# Patient Record
Sex: FEMALE | Race: WHITE | NOT HISPANIC OR LATINO | Employment: FULL TIME | URBAN - METROPOLITAN AREA
[De-identification: names, ages, dates, MRNs, and addresses within clinical notes are randomized per-mention and may not be internally consistent; named-entity substitution may affect disease eponyms.]

---

## 2023-05-25 ENCOUNTER — TELEPHONE (OUTPATIENT)
Dept: PRIMARY CARE | Facility: CLINIC | Age: 53
End: 2023-05-25
Payer: COMMERCIAL

## 2023-05-25 DIAGNOSIS — Z78.0 POSTMENOPAUSAL: ICD-10-CM

## 2023-05-25 DIAGNOSIS — L71.9 ACNE ROSACEA: ICD-10-CM

## 2023-05-25 DIAGNOSIS — E55.9 VITAMIN D DEFICIENCY: ICD-10-CM

## 2023-05-25 DIAGNOSIS — E03.9 HYPOTHYROIDISM, UNSPECIFIED TYPE: ICD-10-CM

## 2023-05-25 NOTE — TELEPHONE ENCOUNTER
Patient requesting the labs orders for bone density, vitamin D, lipid panel, complete blood count, CBC, and TSH. Patient stating that the orders she has are  from 6 months ago.    Thank you.

## 2023-05-31 PROBLEM — L71.9 ACNE ROSACEA: Status: ACTIVE | Noted: 2023-05-31

## 2023-05-31 PROBLEM — E55.9 VITAMIN D DEFICIENCY: Status: ACTIVE | Noted: 2023-05-31

## 2023-05-31 PROBLEM — E03.9 HYPOTHYROIDISM: Status: ACTIVE | Noted: 2023-05-31

## 2023-05-31 PROBLEM — C44.310 BASAL CELL CARCINOMA, FACE: Status: ACTIVE | Noted: 2023-05-31

## 2023-05-31 PROBLEM — R14.0 BLOATING: Status: ACTIVE | Noted: 2023-05-31

## 2023-10-04 DIAGNOSIS — E55.9 VITAMIN D DEFICIENCY: Primary | ICD-10-CM

## 2023-10-04 RX ORDER — ACETAMINOPHEN 500 MG
50 TABLET ORAL DAILY
Qty: 90 CAPSULE | Refills: 1 | Status: SHIPPED | OUTPATIENT
Start: 2023-10-04 | End: 2024-04-01

## 2024-01-30 RX ORDER — CLINDAMYCIN PHOSPHATE 10 MG/G
GEL TOPICAL
Qty: 60 G | Refills: 0 | OUTPATIENT
Start: 2024-01-30

## 2024-02-02 ENCOUNTER — TELEPHONE (OUTPATIENT)
Dept: PRIMARY CARE | Facility: CLINIC | Age: 54
End: 2024-02-02
Payer: COMMERCIAL

## 2024-02-02 DIAGNOSIS — L71.9 ACNE ROSACEA: ICD-10-CM

## 2024-02-02 RX ORDER — CLINDAMYCIN PHOSPHATE 10 MG/G
GEL TOPICAL
COMMUNITY
End: 2024-02-02 | Stop reason: SDUPTHER

## 2024-02-02 RX ORDER — CLINDAMYCIN PHOSPHATE 10 MG/G
GEL TOPICAL
Qty: 30 G | Refills: 0 | Status: SHIPPED | OUTPATIENT
Start: 2024-02-02 | End: 2024-05-25 | Stop reason: SDUPTHER

## 2024-02-02 NOTE — TELEPHONE ENCOUNTER
Refill Clindamycin 1% gel apply twice daily    Pharmacy: [facility]     LR: 10/01/22  LV: 10/11/22  NV: 02/20/24

## 2024-02-13 ENCOUNTER — APPOINTMENT (OUTPATIENT)
Dept: PRIMARY CARE | Facility: CLINIC | Age: 54
End: 2024-02-13
Payer: COMMERCIAL

## 2024-02-20 ENCOUNTER — OFFICE VISIT (OUTPATIENT)
Dept: PRIMARY CARE | Facility: CLINIC | Age: 54
End: 2024-02-20
Payer: COMMERCIAL

## 2024-02-20 VITALS
WEIGHT: 141 LBS | BODY MASS INDEX: 27.54 KG/M2 | TEMPERATURE: 98.3 F | DIASTOLIC BLOOD PRESSURE: 83 MMHG | SYSTOLIC BLOOD PRESSURE: 117 MMHG

## 2024-02-20 DIAGNOSIS — Z00.00 HEALTH CARE MAINTENANCE: Primary | ICD-10-CM

## 2024-02-20 DIAGNOSIS — E03.9 HYPOTHYROIDISM, UNSPECIFIED TYPE: ICD-10-CM

## 2024-02-20 DIAGNOSIS — E78.1 HYPERTRIGLYCERIDEMIA: ICD-10-CM

## 2024-02-20 DIAGNOSIS — E55.9 VITAMIN D DEFICIENCY: ICD-10-CM

## 2024-02-20 PROCEDURE — 99214 OFFICE O/P EST MOD 30 MIN: CPT | Performed by: FAMILY MEDICINE

## 2024-02-20 PROCEDURE — 1036F TOBACCO NON-USER: CPT | Performed by: FAMILY MEDICINE

## 2024-02-20 RX ORDER — LEVOTHYROXINE SODIUM 75 UG/1
75 TABLET ORAL DAILY
COMMUNITY

## 2024-02-20 NOTE — PROGRESS NOTES
Subjective   Patient ID: [id number]     Hali Willis is a 54 y.o. female who presents for Med Management.    HPI  Taking meds as directed without tolerability or affordability issues; no complaints today.  Patient denies ever having melanoma    Review of Systems  GEN-denies, fever, weakness or myalgias; no unexplained fever or chills  OPTH-No dry eyes, itchy eyes, or blurry vision   ENT-No hearing loss, tinnitus or vertigo  NECK-no stiffness, swelling or pain  PSYCH-No complaints regarding libido, appetite, memory or concentration;  no drug use or alcohol usage over six per week  SLEEP-No complaints of insomnia, apnea or restless legs  ALL/IMMUN-No history of sneezing or itching  HEM-No unexplained bruising or bleeding    Objective     /83 (BP Location: Left arm, Patient Position: Sitting)   Temp 36.8 °C (98.3 °F) (Oral)   Wt 64 kg (141 lb)   BMI 27.54 kg/m²      Physical Exam  Eyes-pupils equal round, reactive to light and accommodation, fundi with normal cup/disc ratio, conjunctiva without redness or discharge  General- well defined, well nourished, well hydrated individual in NAD  Skin- normal color and turgor; without nail pitting  Head-normocephalic without masses or tenderness  Ears-normal pinnae, and canals, with normal landmarks and light reflex of tympanic membranes bilaterally  Nose-septum in the midline, normal mucosa bilaterally  Throat- without erythema or exudate, uvula in midline  Neck-supple without lymphadenopathy or thyromegaly; no carotid bruits  Heart- regular rate and rhythm, normal s1 and s2 without murmur or gallop  Lungs-clear to auscultation  Abdomen-soft, positive bowel sounds, without masses, HSmegaly or pain     Assessment/Plan     Problem List Items Addressed This Visit       Hypothyroidism    Vitamin D deficiency    Relevant Orders    Vitamin D 25-Hydroxy,Total (for eval of Vitamin D levels)     Other Visit Diagnoses       Health care maintenance    -  Primary    Relevant Orders     BI mammo bilateral screening tomosynthesis    Hypertriglyceridemia        Relevant Orders    Lipid panel    Comprehensive metabolic panel          You are eligible for the COVID booster.  Without a recent (within 90 days) challenge (booster or infection) your immune system will be three days behind in protecting you from the infection.  You will infect approximately five people by that time.    Please get your mammogram and bone density.    Follow up fasting (no alcohol for 48 hours and just water for 14 hours) in one year for your next routine appointment.  In general, take any medications on schedule (except for types of Insulin).        Zaheer Hernández MD

## 2024-02-20 NOTE — PATIENT INSTRUCTIONS
You are eligible for the COVID booster.  Without a recent (within 90 days) challenge (booster or infection) your immune system will be three days behind in protecting you from the infection.  You will infect approximately five people by that time.    Please get your mammogram and bone density.    Follow up fasting (no alcohol for 48 hours and just water for 14 hours) in one year for your next routine appointment.  In general, take any medications on schedule (except for types of Insulin).

## 2024-04-02 DIAGNOSIS — L71.9 ACNE ROSACEA: ICD-10-CM

## 2024-04-02 RX ORDER — CLINDAMYCIN PHOSPHATE 10 MG/G
GEL TOPICAL
Qty: 30 G | Refills: 0 | OUTPATIENT
Start: 2024-04-02

## 2024-05-23 DIAGNOSIS — L71.9 ACNE ROSACEA: ICD-10-CM

## 2024-05-24 RX ORDER — CLINDAMYCIN PHOSPHATE 10 MG/G
GEL TOPICAL
Qty: 30 G | Refills: 0 | OUTPATIENT
Start: 2024-05-24

## 2024-05-25 ENCOUNTER — TELEPHONE (OUTPATIENT)
Dept: PRIMARY CARE | Facility: CLINIC | Age: 54
End: 2024-05-25
Payer: COMMERCIAL

## 2024-05-25 DIAGNOSIS — L71.9 ACNE ROSACEA: ICD-10-CM

## 2024-05-25 RX ORDER — CLINDAMYCIN PHOSPHATE 10 MG/G
GEL TOPICAL
Qty: 60 G | Refills: 1 | Status: SHIPPED | OUTPATIENT
Start: 2024-05-25

## 2024-05-25 NOTE — TELEPHONE ENCOUNTER
Dr. CANALES pt and DR. BETTS is covering     Refill:    Clindamycin 1 %gel Apply and gently massage into affected areas twice daily     Pharm: [facility] # [phone]    LR : 02/02/24 qty 1 no refills  LV: 02/20/24  NV: 02/25/25

## 2024-11-25 ENCOUNTER — TELEPHONE (OUTPATIENT)
Dept: PRIMARY CARE | Facility: CLINIC | Age: 54
End: 2024-11-25
Payer: COMMERCIAL

## 2024-11-25 DIAGNOSIS — L71.9 ACNE ROSACEA: ICD-10-CM

## 2024-11-25 RX ORDER — CLINDAMYCIN PHOSPHATE 10 MG/G
GEL TOPICAL
Qty: 60 G | Refills: 1 | Status: SHIPPED | OUTPATIENT
Start: 2024-11-25

## 2024-11-25 NOTE — TELEPHONE ENCOUNTER
Rx Refill Request Telephone Encounter    Name:  Hali Willis  :  [id number]  Medication Name:  clindamycin (Cleocin T) 1 % gel   Dispense Quantity: 60 g  Last refill: 24  Sig: Apply and gently massage into affected areas twice daily  Specific Pharmacy location:  [id number] [address] [facility]  [address] [facility] [id number]  Date of last appointment:  24  Date of next appointment:  25  Best number to reach patient:  [phone]

## 2025-02-25 ENCOUNTER — APPOINTMENT (OUTPATIENT)
Dept: PRIMARY CARE | Facility: CLINIC | Age: 55
End: 2025-02-25
Payer: COMMERCIAL

## 2025-03-04 ENCOUNTER — APPOINTMENT (OUTPATIENT)
Dept: PRIMARY CARE | Facility: CLINIC | Age: 55
End: 2025-03-04
Payer: COMMERCIAL

## 2025-03-16 DIAGNOSIS — L71.9 ACNE ROSACEA: ICD-10-CM

## 2025-03-17 RX ORDER — CLINDAMYCIN PHOSPHATE 10 MG/G
GEL TOPICAL
Qty: 60 G | Refills: 0 | OUTPATIENT
Start: 2025-03-17

## 2025-03-19 DIAGNOSIS — L71.9 ACNE ROSACEA: ICD-10-CM

## 2025-03-20 RX ORDER — CLINDAMYCIN PHOSPHATE 10 MG/G
GEL TOPICAL
Qty: 60 G | Refills: 1 | Status: SHIPPED | OUTPATIENT
Start: 2025-03-20

## 2025-03-20 NOTE — TELEPHONE ENCOUNTER
JT PT DG is covering. Pt needs a 30 day supply sent by a covering. Pt called stating that she is almost out of medication.

## 2025-04-15 ENCOUNTER — APPOINTMENT (OUTPATIENT)
Dept: PRIMARY CARE | Facility: CLINIC | Age: 55
End: 2025-04-15
Payer: COMMERCIAL

## 2025-04-23 NOTE — PROGRESS NOTES
Subjective   Patient ID: [id number]     Hali Willis is a 55 y.o. female who presents for Annual Exam.    HPI  Hali presents for a complete physical.  Still an occasional smoker  Patient defers on pneumonia vaccine    Review of Systems  General-denies weakness or myalgias;  no unexplained fever or chills  Cardiovascular- No chest pain or pressure, nausea, diaphoresis, paresthesias, dizziness, or syncope with or without exertion  Gastrointestinal-No blood in stool, tarry stools, pain, vomiting, heartburn, constipation or diarrhea  Pulmonary-No wheezing, coughing or shortness of breath  Psychiatric-No complaints regarding libido, appetite, memory or concentration;  no drug use or alcohol usage over six per week  Endocrinology- No change in hair, voice, skin, weight or temperature tolerance   Sleep-No complaints of insomnia, apnea or restless legs  Urology-No frequency, urgency, blood in urine, or incontinence  Musculoskeletal-No locking, giving way/swelling of joints  Neurology- No headaches, hx of concussion, falls in the last year or seizure  Allergy/Immunology-No history of sneezing or itching  Dermatology-No rashes, blanching or  change in any moles   ENT-No hearing loss, tinnitus or vertigo  OPTH-no blurry vision, dry or itchy eyes    Objective     /76 (BP Location: Left arm, Patient Position: Sitting)   Temp 36.7 °C (98.1 °F) (Oral)   Ht 1.524 m (5')   Wt 65.3 kg (143 lb 15.4 oz)   BMI 28.12 kg/m²      Physical Exam  Eyes-pupils equal round, reactive to light and accommodation, fundi with normal cup/disc ratio, conjunctiva without redness or discharge  General- well defined, well nourished, well hydrated individual in NAD  Skin- normal color and turgor; without nail pitting  Head-normocephalic without masses or tenderness  Ears-normal pinnae, and canals, with normal landmarks and light reflex of tympanic membranes bilaterally  Nose-septum in the midline, normal mucosa bilaterally  Throat- without  erythema or exudate, uvula in midline  Neck-supple without lymphadenopathy or thyromegaly; no carotid bruits  Heart- regular rate and rhythm, normal s1 and s2 without murmur or gallop  Lungs-clear to auscultation  Abdomen-soft, positive bowel sounds, without masses, HSmegaly or pain   Extremities- without cords, cyanosis, clubbing or edema;  no nodes/joint swelling  Back-without CVA or spine tenderness    ASCVD risk is 4.6% over the next ten years after review of [facility] labs    Assessment/Plan     Problem List Items Addressed This Visit       Acne rosacea    Relevant Medications    clindamycin (Cleocin T) 1 % gel    Hypothyroidism - Primary    Vitamin D deficiency    Umbilical hernia     Other Visit Diagnoses         Encounter for screening mammogram for breast cancer        Relevant Orders    BI mammo bilateral screening tomosynthesis      Health care maintenance          Polyp of colon, unspecified part of colon, unspecified type        Relevant Orders    Colonoscopy Screening; High Risk Patient          Quitting smoking is the single most important thing you can do for your health.  We can help.     Please call if you have any questions regarding the Living Will and/or the Durable Power of  for Healthcare.  If you decide to proceed, get your signature notarized and provide us a copy for your file.     Please get your PAP smear and your mammogram    Your colonoscopy needs to be done  now due to subotimal prep fro your last colonoscopy     You are eligible for the COVID booster.  Without a recent (within 90 days) challenge (booster or infection) your immune system will be three days behind in protecting you from the infection.  You will infect approximately five people by that time.    Follow up fasting (no alcohol for 48 hours and just water for 14 hours) in twelve months for your next routine appointment.  In general, take any medications on schedule (except for types of Insulin).      Zaheer Hernández MD

## 2025-04-23 NOTE — PATIENT INSTRUCTIONS
Quitting smoking is the single most important thing you can do for your health.  We can help.     Please call if you have any questions regarding the Living Will and/or the Durable Power of  for Healthcare.  If you decide to proceed, get your signature notarized and provide us a copy for your file.     Please get your PAP smear and your mammogram    Your colonoscopy needs to be done  now due to subotimal prep fro your last colonoscopy     You are eligible for the COVID booster.  Without a recent (within 90 days) challenge (booster or infection) your immune system will be three days behind in protecting you from the infection.  You will infect approximately five people by that time.    Follow up fasting (no alcohol for 48 hours and just water for 14 hours) in twelve months for your next routine appointment.  In general, take any medications on schedule (except for types of Insulin).

## 2025-04-24 ENCOUNTER — APPOINTMENT (OUTPATIENT)
Dept: PRIMARY CARE | Facility: CLINIC | Age: 55
End: 2025-04-24
Payer: COMMERCIAL

## 2025-04-24 VITALS
WEIGHT: 143.96 LBS | TEMPERATURE: 98.1 F | SYSTOLIC BLOOD PRESSURE: 122 MMHG | DIASTOLIC BLOOD PRESSURE: 76 MMHG | BODY MASS INDEX: 28.26 KG/M2 | HEIGHT: 60 IN

## 2025-04-24 DIAGNOSIS — E55.9 VITAMIN D DEFICIENCY: ICD-10-CM

## 2025-04-24 DIAGNOSIS — K42.9 UMBILICAL HERNIA WITHOUT OBSTRUCTION AND WITHOUT GANGRENE: ICD-10-CM

## 2025-04-24 DIAGNOSIS — K63.5 POLYP OF COLON, UNSPECIFIED PART OF COLON, UNSPECIFIED TYPE: ICD-10-CM

## 2025-04-24 DIAGNOSIS — L71.9 ACNE ROSACEA: ICD-10-CM

## 2025-04-24 DIAGNOSIS — Z00.00 HEALTH CARE MAINTENANCE: ICD-10-CM

## 2025-04-24 DIAGNOSIS — Z12.31 ENCOUNTER FOR SCREENING MAMMOGRAM FOR BREAST CANCER: ICD-10-CM

## 2025-04-24 DIAGNOSIS — E03.9 HYPOTHYROIDISM, UNSPECIFIED TYPE: Primary | ICD-10-CM

## 2025-04-24 PROCEDURE — 99396 PREV VISIT EST AGE 40-64: CPT | Performed by: FAMILY MEDICINE

## 2025-04-24 PROCEDURE — 3008F BODY MASS INDEX DOCD: CPT | Performed by: FAMILY MEDICINE

## 2025-04-24 PROCEDURE — 1036F TOBACCO NON-USER: CPT | Performed by: FAMILY MEDICINE

## 2025-04-24 RX ORDER — CLINDAMYCIN PHOSPHATE 10 MG/G
GEL TOPICAL
Qty: 60 G | Refills: 1 | Status: SHIPPED | OUTPATIENT
Start: 2025-04-24

## 2026-04-28 ENCOUNTER — APPOINTMENT (OUTPATIENT)
Dept: PRIMARY CARE | Facility: CLINIC | Age: 56
End: 2026-04-28
Payer: COMMERCIAL